# Patient Record
(demographics unavailable — no encounter records)

---

## 2024-10-27 NOTE — ASSESSMENT
[FreeTextEntry1] : 1. mid back pain - since age 18 - imaging is normal, given paul onset of back pain - consideration for inflammatory back pain - send for Spa labs 2. scoliosis - likely the cause of her pain - consider PT after all tests are completed  patient to call in 1 week to discuss results and next steps   f/u 3 months

## 2024-10-27 NOTE — PHYSICAL EXAM
[General Appearance - Alert] : alert [General Appearance - In No Acute Distress] : in no acute distress [Neck Appearance] : the appearance of the neck was normal [Neck Cervical Mass (___cm)] : no neck mass was observed [Jugular Venous Distention Increased] : there was no jugular-venous distention [Thyroid Diffuse Enlargement] : the thyroid was not enlarged [Thyroid Nodule] : there were no palpable thyroid nodules [Auscultation Breath Sounds / Voice Sounds] : lungs were clear to auscultation bilaterally [Heart Rate And Rhythm] : heart rate was normal and rhythm regular [Heart Sounds] : normal S1 and S2 [Heart Sounds Gallop] : no gallops [Murmurs] : no murmurs [Heart Sounds Pericardial Friction Rub] : no pericardial rub [Full Pulse] : the pedal pulses are present [Edema] : there was no peripheral edema [Bowel Sounds] : normal bowel sounds [Abdomen Soft] : soft [Abdomen Tenderness] : non-tender [Abdomen Mass (___ Cm)] : no abdominal mass palpated [Skin Color & Pigmentation] : normal skin color and pigmentation [Skin Turgor] : normal skin turgor [] : no rash [FreeTextEntry1] : hyperflexible joints: hips, elbows and fingers, wrists.  all other joints with Full ROM - no synovitis

## 2024-10-27 NOTE — HISTORY OF PRESENT ILLNESS
[FreeTextEntry1] : patient hx of back pain since age 18 - upper and mid back - worse over the last 3-4 months -  pain is worse when sitting or standing also, when doing core exercises as well.  Imaging is normal, with mild scoliosis over the mid-thoracic spine.  No problem sleeping - no pain when lying down.  Does not take any medication at this time.  Has noted some hyperflexibility.   no other complains today   Denies any fevers, chill, rashes, weight loss,fatigue,  hair loss,  dry eyes or mouth, mouth sores, Raynaud's. chest pain or SOB, GI or , numbness/tingling